# Patient Record
Sex: FEMALE | Race: BLACK OR AFRICAN AMERICAN | NOT HISPANIC OR LATINO | URBAN - METROPOLITAN AREA
[De-identification: names, ages, dates, MRNs, and addresses within clinical notes are randomized per-mention and may not be internally consistent; named-entity substitution may affect disease eponyms.]

---

## 2024-02-06 ENCOUNTER — TELEPHONE (OUTPATIENT)
Dept: SURGERY | Facility: CLINIC | Age: 29
End: 2024-02-06
Payer: OTHER GOVERNMENT

## 2024-02-11 PROBLEM — N63.20 LEFT BREAST MASS: Status: ACTIVE | Noted: 2024-02-11

## 2024-02-11 PROBLEM — N64.4 MASTODYNIA OF LEFT BREAST: Status: ACTIVE | Noted: 2024-02-11

## 2024-02-12 ENCOUNTER — OFFICE VISIT (OUTPATIENT)
Dept: SURGERY | Facility: CLINIC | Age: 29
End: 2024-02-12
Payer: OTHER GOVERNMENT

## 2024-02-12 VITALS — HEIGHT: 64 IN | BODY MASS INDEX: 36.19 KG/M2 | WEIGHT: 212 LBS

## 2024-02-12 DIAGNOSIS — N63.22 MASS OF UPPER INNER QUADRANT OF LEFT BREAST: Primary | ICD-10-CM

## 2024-02-12 DIAGNOSIS — N63.10 MASS OF RIGHT BREAST, UNSPECIFIED QUADRANT: ICD-10-CM

## 2024-02-12 DIAGNOSIS — Z80.3 FAMILY HISTORY OF MALIGNANT NEOPLASM OF BREAST: ICD-10-CM

## 2024-02-12 DIAGNOSIS — N64.4 MASTODYNIA OF LEFT BREAST: ICD-10-CM

## 2024-02-12 PROCEDURE — 99203 OFFICE O/P NEW LOW 30 MIN: CPT | Mod: S$PBB,,, | Performed by: NURSE PRACTITIONER

## 2024-02-12 PROCEDURE — 99999 PR PBB SHADOW E&M-EST. PATIENT-LVL III: CPT | Mod: PBBFAC,,, | Performed by: NURSE PRACTITIONER

## 2024-02-12 PROCEDURE — 99213 OFFICE O/P EST LOW 20 MIN: CPT | Mod: PBBFAC,PN | Performed by: NURSE PRACTITIONER

## 2024-02-12 RX ORDER — PNV NO.95/FERROUS FUM/FOLIC AC 28MG-0.8MG
1 TABLET ORAL DAILY
COMMUNITY
Start: 2024-01-28

## 2024-02-12 NOTE — ASSESSMENT & PLAN NOTE
Her imaging and exams are consistent with benign changes. She understands the importance of monthly self-breast exams and knows to notify me of any and all changes as they occur.

## 2024-02-12 NOTE — PROGRESS NOTES
[facility]  Reese Rodrigez MD, FACS  Shawn Santiago NP-C      Date of Service: 2/12/2024    Chief Complaint:   Zenobia Carreon is a 28 y.o. female presenting today for left breast pain that she first noticed in 2021. It comes and goes. She has a history of a benign left breast mass that has been followed and cleared in [place]. Dr. Skinner noticed a right breast mass on exam last week. She's never noticed it before.     History of Present Illness:    presents on February 12, 2024 due to a left breast mass and  left breast pain. She has a history of a left breast mass.  She was followed every 6 month in [place]. In June 2023 her left breast was cleared. She has a history of a left breast biopsy in 2021 that revealed benign changes.  MD::: Jeff Frye MD    Past Medical History:   Diagnosis Date    Family history of malignant neoplasm of breast 02/12/2024    Left breast mass 02/11/2024    Mastodynia of left breast 02/11/2024      Past Surgical History:   Procedure Laterality Date    left breast sonocore biopsy 12/2021          Current Outpatient Medications:     PRENATAL 28 mg iron- 800 mcg Tab, Take 1 tablet by mouth once daily., Disp: , Rfl:    Review of patient's allergies indicates:  No Known Allergies   Social History     Tobacco Use    Smoking status: Never    Smokeless tobacco: Never   Substance Use Topics    Alcohol use: Not on file      Family History   Problem Relation Age of Onset    Breast cancer Maternal Grandmother     Breast cancer Maternal Aunt     Ovarian cancer Neg Hx         Review of Systems   Integumentary:  Positive for breast mass. Negative for color change, rash, mole/lesion, breast discharge and breast tenderness.   Breast: Positive for mass.Negative for tenderness       Physical Exam   Constitutional: She appears well-developed. She is cooperative.   HENT:   Head: Normocephalic.   Cardiovascular:  Normal rate and regular rhythm.             Pulmonary/Chest: She exhibits no tenderness and no bony tenderness. Right breast exhibits no mass, no nipple discharge, no skin change and no tenderness. Left breast exhibits no mass, no nipple discharge, no skin change and no tenderness.   Abdominal: Soft. Normal appearance.   Musculoskeletal: Lymphadenopathy:      Upper Body:      Right upper body: No supraclavicular or axillary adenopathy.      Left upper body: No supraclavicular or axillary adenopathy.     Neurological: She is alert.   Skin: No rash noted.        Mammogram:  Today- Benign nodules and calcifications noted.     Ultrasound: Today Left- Benign changes noted.    ASSESSMENT and PLAN OF CARE     1. Mass of upper inner quadrant of left breast  Assessment & Plan:  Today's imaging and exams are consistent with benign appearing changes. She understands the importance of monthly self-breast exams and knows to notify me of any and all changes as they occur.     Orders:  -     US Breast Left Limited; Future; Expected date: 02/12/2024    2. Mastodynia of left breast  Assessment & Plan:  We discussed our fibrocystic mastopathy protocol in detail. She knows that if she follows this protocol - that her symptoms should improve.  We discussed how breast pain is usually not associated with breast cancer, however, pain can be the presenting symptom with some cancers (but this could be coincidental). Still, if her pain does not improve in 8-12 weeks she should call us back for additional recommendations.      Orders:  -     US Breast Left Limited; Future; Expected date: 02/12/2024    3. Mass of right breast, unspecified quadrant  Assessment & Plan:  Her imaging and exams are consistent with benign changes. She understands the importance of monthly self-breast exams and knows to notify me of any and all changes as they occur.       4. Family history of malignant neoplasm of breast  Assessment & Plan:  We discussed her family history and how it could impact her own  future risks.  We discussed family vs. genetic history and the importance and implications of each.  All questions answered to her satisfaction.  She knows that as additional family members are diagnosed - she will need to let us know as this may change follow up and imaging recommendations.    We had a discussion concerning Breast Cancer Risk Reduction and current NCCN Guidelines. She knows that her risk can be lowered slightly with a healthy lifestyle and minimal ETOH use. Being physically active will also help. She should reduce or stay away from OCPs and HRT as possible.         Medical Decision Making: It is my impression that this patient suffers all conditions contained in this medical document.  Each of these conditions did affect our plan of care and my medical decision making today.  It is my opinion that the medical decision making concerning this patient was of moderate difficulty based on the aforementioned conditions.  Any further recommendations will be communicated to the patient by me.  I have reviewed and verified her allergies, list of medications, medical and surgical histories, social history, and a pertinent review of symptoms.    Follow up:  6 months and prn    For:  Physical Examination    Addendum 2/19/24 7651: Mammogram and Ultrasound- FINDINGS: The patient has had left breast core biopsy. There are scattered fibroglandular elements noted. There are no suspicious masses or suspicious calcifications seen to suggest malignancy. Benign-type calcifications are   identified.    Targeted ultrasound of the left breast demonstrates foci of dystrophic   calcification from fat necrosis at the 10 o'clock N 5 position. This corresponds   to calcifications seen mammographically with a core biopsy clip in the vicinity.  IMPRESSION:No evidence of malignancy. Follow-up mammography is recommended at   age 40.

## 2024-02-12 NOTE — ASSESSMENT & PLAN NOTE
Today's imaging and exams are consistent with benign appearing changes. She understands the importance of monthly self-breast exams and knows to notify me of any and all changes as they occur.

## 2024-08-12 ENCOUNTER — OFFICE VISIT (OUTPATIENT)
Dept: SURGERY | Facility: CLINIC | Age: 29
End: 2024-08-12
Payer: OTHER GOVERNMENT

## 2024-08-12 VITALS — BODY MASS INDEX: 32.61 KG/M2 | WEIGHT: 190 LBS

## 2024-08-12 DIAGNOSIS — Z80.3 FAMILY HISTORY OF MALIGNANT NEOPLASM OF BREAST: ICD-10-CM

## 2024-08-12 DIAGNOSIS — N64.4 MASTODYNIA OF LEFT BREAST: ICD-10-CM

## 2024-08-12 DIAGNOSIS — N63.22 MASS OF UPPER INNER QUADRANT OF LEFT BREAST: Primary | ICD-10-CM

## 2024-08-12 PROCEDURE — 99999 PR PBB SHADOW E&M-EST. PATIENT-LVL II: CPT | Mod: PBBFAC,,, | Performed by: NURSE PRACTITIONER

## 2024-08-12 PROCEDURE — 99212 OFFICE O/P EST SF 10 MIN: CPT | Mod: PBBFAC,PN | Performed by: NURSE PRACTITIONER

## 2024-08-12 PROCEDURE — 99213 OFFICE O/P EST LOW 20 MIN: CPT | Mod: S$PBB,,, | Performed by: NURSE PRACTITIONER

## 2024-08-12 RX ORDER — PNV NO.95/FERROUS FUM/FOLIC AC 28MG-0.8MG
TABLET ORAL
COMMUNITY

## 2024-08-12 NOTE — PROGRESS NOTES
[facility]  MD Shawn Cowan, NP-C    Date of Service: 8/12/2024      Chief Complaint:   Zenobia Carreon is a 28 y.o. female presenting today for  6 month follow up. She is due for Physical Examination  She reports no interval changes on her self-breast examination.     History of Present Illness:    first presented on February 12, 2024 due to a left breast mass and  left breast pain. She has a history of a left breast mass.  She was followed every 6 month in [place]. In June 2023 her left breast was cleared. She has a history of a left breast biopsy in 2021 that revealed benign changes.  MD::: Jeff Frye MD     Past Medical History:   Diagnosis Date    Family history of malignant neoplasm of breast 02/12/2024    Left breast mass 02/11/2024    Mastodynia of left breast 02/11/2024      Past Surgical History:   Procedure Laterality Date    left breast sonocore biopsy 12/2021          Current Outpatient Medications:     PRENATAL 28 mg iron- 800 mcg Tab, Take 1 tablet by mouth once daily., Disp: , Rfl:     vitamin E 1000 UNIT capsule, , Disp: , Rfl:    Review of patient's allergies indicates:  No Known Allergies   Social History     Tobacco Use    Smoking status: Never    Smokeless tobacco: Never   Substance Use Topics    Alcohol use: Not on file      Family History   Problem Relation Name Age of Onset    Breast cancer Maternal Grandmother      Breast cancer Maternal Aunt      Ovarian cancer Neg Hx          Review of Systems   Integumentary:  Negative for color change, rash, mole/lesion, breast mass, breast discharge and breast tenderness.   Breast: Negative for mass and tenderness       Physical Exam   HENT:   Head: Normocephalic.   Pulmonary/Chest: Right breast exhibits no inverted nipple, no mass, no nipple discharge, no skin change and no tenderness. Left breast exhibits no inverted nipple, no mass, no nipple discharge, no skin change and no  tenderness. No breast swelling.   Genitourinary: No breast swelling.   Musculoskeletal: Lymphadenopathy:      Upper Body:      Right upper body: No supraclavicular or axillary adenopathy.      Left upper body: No supraclavicular or axillary adenopathy.     Neurological: She is alert.          Assessment/Plan  1. Mass of upper inner quadrant of left breast  Assessment & Plan:  We reviewed our findings today and her questions were answered.  She understands that her exams have remained stable (and show nothing concerning).  She is comfortable being followed in a conservative fashion.      She understands the importance of monthly self-breast examination and knows to report any and all changes as they occur.            2. Mastodynia of left breast  Assessment & Plan:  We discussed our fibrocystic mastopathy protocol in detail. She knows that if she follows this protocol - that her symptoms should improve.  We discussed how breast pain is usually not associated with breast cancer, however, pain can be the presenting symptom with some cancers (but this could be coincidental). Still, if her pain does not improve in 8-12 weeks she should call us back for additional recommendations.        3. Family history of malignant neoplasm of breast  Assessment & Plan:  We discussed her family history and how it could impact her own future risks.  We discussed family vs. genetic history and the importance and implications of each.  All questions answered to her satisfaction.  She knows that as additional family members are diagnosed - she will need to let us know as this may change follow up and imaging recommendations.    We had a discussion concerning Breast Cancer Risk Reduction and current NCCN Guidelines. She knows that her risk can be lowered slightly with a healthy lifestyle and minimal ETOH use. Being physically active will also help. She should reduce or stay away from OCPs and HRT as possible.                Medical Decision  Making:  It is my impression that this patient suffers all conditions contained in this medical document.  Each of these conditions did affect our plan of care and my medical decision making today.  It is my opinion that the medical decision making concerning this patient was of moderate difficulty based on the aforementioned conditions.  Any further recommendations will be communicated to the patient by me.  I have reviewed and verified her allergies, list of medications, medical and surgical histories, social history, and a pertinent review of symptoms.      Follow up:  1 year and prn    For:  Physical Examination

## 2024-08-12 NOTE — ASSESSMENT & PLAN NOTE
We reviewed our findings today and her questions were answered.  She understands that her exams have remained stable (and show nothing concerning).  She is comfortable being followed in a conservative fashion.      She understands the importance of monthly self-breast examination and knows to report any and all changes as they occur.

## 2025-05-28 ENCOUNTER — PATIENT MESSAGE (OUTPATIENT)
Dept: SURGERY | Facility: CLINIC | Age: 30
End: 2025-05-28